# Patient Record
(demographics unavailable — no encounter records)

---

## 2024-11-05 NOTE — REASON FOR VISIT
[Follow-Up] : a follow-up visit [Abnormal CT Scan] : abnormal CT Scan [Bronchiectasis] : bronchiectasis [FreeTextEntry2] : ANDRE

## 2024-11-05 NOTE — ADDENDUM
[FreeTextEntry1] : Pt has been on vest therapy x many yrs At time of initial vest order had productive cough for greater than 6 months  Failed PEP device after months of use, has been using and benefitting with vest therapy x years She continues to have a productive cough and is now in ER with new infiltrates and will be treated as exacerbation of bronchiectasis A new vest order has been placed and is mandatory to her continued care, as well as standard of care in [place]

## 2024-11-05 NOTE — CONSULT LETTER
[Dear  ___] : Dear  [unfilled], [Consult Letter:] : I had the pleasure of evaluating your patient, [unfilled]. [Please see my note below.] : Please see my note below. [Sincerely,] : Sincerely, [FreeTextEntry3] : Wei Rogers DO [facility]\par  Pulmonary Critical Care\par  Director Pulmonary Division\par  Medical Director Respiratory Therapy\par  [facility]\par  \par

## 2024-11-05 NOTE — PHYSICAL EXAM
[General Appearance - Well Developed] : well developed [Normal Appearance] : normal appearance [General Appearance - Well Nourished] : well nourished [Neck Appearance] : the appearance of the neck was normal [Heart Rate And Rhythm] : heart rate and rhythm were normal [Heart Sounds] : normal S1 and S2 [Murmurs] : no murmurs present [Respiration, Rhythm And Depth] : normal respiratory rhythm and effort [Exaggerated Use Of Accessory Muscles For Inspiration] : no accessory muscle use [No Focal Deficits] : no focal deficits [Oriented To Time, Place, And Person] : oriented to person, place, and time [Impaired Insight] : insight and judgment were intact [Affect] : the affect was normal [] : no rash [FreeTextEntry1] : no chest wall abn

## 2024-11-05 NOTE — HISTORY OF PRESENT ILLNESS
[Never] : never [FreeTextEntry1] : ANDRE off therapy x 3 yrs on probiotic Doing well using NEW therapy vest and albuterol neb daily and Flutter valve no fever, chill, chest pain no sig dyspnea, sputum clear Therapy vest bid with nebulizer, albuterol and alternating with saline Uses  Vit C, NAC rotating abx with po Vanco doing well, no acute pulmonary complaints recent sputums noted

## 2024-11-05 NOTE — DISCUSSION/SUMMARY
[FreeTextEntry1] : Bronchiectasis, hx ANDRE, post hospitalization for exacerbation/pna, complicated by C dificile  at  baseline, no acute pulmonary complaints Asthma profile negative, chronic IgA deficiency, CF work up negative Spirometry remains normal Doing well with Afflovest- much improved airway clearance home neb  BID stressed ( albuterol, saline, prn Mucomyst) Continue with  Flutter valve Continue with  rotating ABx ( Amoxil and Doxy), 5 days each month with oral Vanco Repeat CT scan for nodule and infiltrates Repeat sputum c/s and AFB negative to date 6 months or sooner if needed, will call with CT scan

## 2024-11-05 NOTE — PROCEDURE
[FreeTextEntry1] :  11/24 sputum c/s neg, AFB c/s neg to date spirometry remains normal, no sig change 2/23 4/24  CT scan REESE scarring, patchy multifocal bronchiectasis, worse LLL, new REESE nodule

## 2024-12-08 NOTE — ASSESSMENT
[FreeTextEntry1] : 73 YO FEMALE   PMH OF BRONCHIECTASIS ANDRE  ANEMIA  ASTHMA MS HTN WITH  FLARE OF BRONCHIECTASIS VERSUS PNEUMONIA  PT WAS GIVEN OUTPT  ORAL ABX VANTIN  AND  DID NOT RESPOND AND WAS SENT TO THE HOSPITAL FOR FURHTER MANAGEMENT BECUASE OF FEVERS WAS IN HOSP 11/4-/11/8 /23  HAD ANOTHER ADMISSION SAME THIN IN APRO 2024 NOW HERE AGIAN IN DECEMBER 2024   PT HAD ANOTHER EPIDODE OF INCREASED COUGH FEVERS  CAME TO [facility]  AND WAS AMDITTED  MIDLINE PLACED ON IV INVANZ HERE FOR FOLLWUP IWTH  PT FEELS BETTER NO FURHTER FEVER COUGH DECREASED PT ON REGIMEN OF ROTAITNG  ABX FROM PULM   AND HAS PULM VEST  WILL LOOK TO SEE IF SHE CAN [facility] IF THER IS A COHORT FOR  SECOND OPINION  WILL FOLLOWUP  PT WIHT HX OF CI DIFF HAS BEEN USING ORAL VANCO  WHILE ON ABX

## 2024-12-08 NOTE — PHYSICAL EXAM
[General Appearance - Alert] : alert [General Appearance - In No Acute Distress] : in no acute distress [Sclera] : the sclera and conjunctiva were normal [PERRL With Normal Accommodation] : pupils were equal in size, round, reactive to light [Outer Ear] : the ears and nose were normal in appearance [Extraocular Movements] : extraocular movements were intact [Oropharynx] : the oropharynx was normal with no thrush [Neck Appearance] : the appearance of the neck was normal [Neck Cervical Mass (___cm)] : no neck mass was observed [Jugular Venous Distention Increased] : there was no jugular-venous distention [Thyroid Diffuse Enlargement] : the thyroid was not enlarged [Auscultation Breath Sounds / Voice Sounds] : lungs were clear to auscultation bilaterally [Heart Rate And Rhythm] : heart rate was normal and rhythm regular [Heart Sounds Gallop] : no gallops [Heart Sounds] : normal S1 and S2 [Murmurs] : no murmurs [Heart Sounds Pericardial Friction Rub] : no pericardial rub [Full Pulse] : the pedal pulses are present [Edema] : there was no peripheral edema [Abdomen Soft] : soft [Bowel Sounds] : normal bowel sounds [Abdomen Tenderness] : non-tender [Abdomen Mass (___ Cm)] : no abdominal mass palpated [Costovertebral Angle Tenderness] : no CVA tenderness [No Palpable Adenopathy] : no palpable adenopathy [Musculoskeletal - Swelling] : no joint swelling [Nail Clubbing] : no clubbing  or cyanosis of the fingernails [Motor Tone] : muscle strength and tone were normal [Skin Color & Pigmentation] : normal skin color and pigmentation [] : no rash [Deep Tendon Reflexes (DTR)] : deep tendon reflexes were 2+ and symmetric [Sensation] : the sensory exam was normal to light touch and pinprick [No Focal Deficits] : no focal deficits

## 2024-12-08 NOTE — REASON FOR VISIT
[Post Hospitalization] : a post hospitalization visit [Spouse] : spouse [FreeTextEntry1] : BRONCHIECTASIS PNEUMONIA POST HOSP ON IV ABX

## 2024-12-08 NOTE — PHYSICAL EXAM
[General Appearance - Alert] : alert [General Appearance - In No Acute Distress] : in no acute distress [Sclera] : the sclera and conjunctiva were normal [PERRL With Normal Accommodation] : pupils were equal in size, round, reactive to light [Extraocular Movements] : extraocular movements were intact [Outer Ear] : the ears and nose were normal in appearance [Oropharynx] : the oropharynx was normal with no thrush [Neck Appearance] : the appearance of the neck was normal [Neck Cervical Mass (___cm)] : no neck mass was observed [Jugular Venous Distention Increased] : there was no jugular-venous distention [Thyroid Diffuse Enlargement] : the thyroid was not enlarged [Auscultation Breath Sounds / Voice Sounds] : lungs were clear to auscultation bilaterally [Heart Rate And Rhythm] : heart rate was normal and rhythm regular [Heart Sounds Gallop] : no gallops [Heart Sounds] : normal S1 and S2 [Murmurs] : no murmurs [Heart Sounds Pericardial Friction Rub] : no pericardial rub [Full Pulse] : the pedal pulses are present [Edema] : there was no peripheral edema [Abdomen Soft] : soft [Bowel Sounds] : normal bowel sounds [Abdomen Tenderness] : non-tender [Abdomen Mass (___ Cm)] : no abdominal mass palpated [Costovertebral Angle Tenderness] : no CVA tenderness [No Palpable Adenopathy] : no palpable adenopathy [Musculoskeletal - Swelling] : no joint swelling [Nail Clubbing] : no clubbing  or cyanosis of the fingernails [Motor Tone] : muscle strength and tone were normal [Skin Color & Pigmentation] : normal skin color and pigmentation [] : no rash [Deep Tendon Reflexes (DTR)] : deep tendon reflexes were 2+ and symmetric [Sensation] : the sensory exam was normal to light touch and pinprick [No Focal Deficits] : no focal deficits

## 2024-12-08 NOTE — ASSESSMENT
[FreeTextEntry1] : 75 YO FEMALE   PMH OF BRONCHIECTASIS ANDRE  ANEMIA  ASTHMA MS HTN WITH  FLARE OF BRONCHIECTASIS VERSUS PNEUMONIA  PT WAS GIVEN OUTPT  ORAL ABX VANTIN  AND  DID NOT RESPOND AND WAS SENT TO THE HOSPITAL FOR FURHTER MANAGEMENT BECUASE OF FEVERS WAS IN HOSP 11/4-/11/8 /23  HAD ANOTHER ADMISSION SAME THIN IN APRO 2024 NOW HERE AGIAN IN DECEMBER 2024   PT HAD ANOTHER EPIDODE OF INCREASED COUGH FEVERS  CAME TO [facility]  AND WAS AMDITTED  MIDLINE PLACED ON IV INVANZ HERE FOR FOLLWUP IWTH  PT FEELS BETTER NO FURHTER FEVER COUGH DECREASED PT ON REGIMEN OF ROTAITNG  ABX FROM PULM   AND HAS PULM VEST  WILL LOOK TO SEE IF SHE CAN [facility] IF THER IS A COHORT FOR  SECOND OPINION  WILL FOLLOWUP  PT WIHT HX OF CI DIFF HAS BEEN USING ORAL VANCO  WHILE ON ABX

## 2024-12-08 NOTE — HISTORY OF PRESENT ILLNESS
[FreeTextEntry1] : 75 YO FEMALE   PMH OF BRONCHIECTASIS ANDRE  ANEMIA  ASTHMA MS HTN WITH  FLARE OF BRONCHIECTASIS VERSUS PNEUMONIA  PT WAS GIVEN OUTPT  ORAL ABX VANTIN  AND  DID NOT RESPOND AND WAS SENT TO THE HOSPITAL FOR FURHTER MANAGEMENT BECUASE OF FEVERS WAS IN HOSP 11/4-/11/8 /23  HAD ANOTHER ADMISSION SAME THIN IN APRO 2024 NOW HERE AGIAN IN DECEMBER 2024   PT HAD ANOTHER EPIDODE OF INCREASED COUGH FEVERS  CAME TO [facility]  AND WAS AMDITTED  MIDLINE PLACED ON IV INVANZ HERE FOR FOLLWUP IWTH

## 2024-12-08 NOTE — HISTORY OF PRESENT ILLNESS
[FreeTextEntry1] : 75 YO FEMALE   PMH OF BRONCHIECTASIS ANDRE  ANEMIA  ASTHMA MS HTN WITH  FLARE OF BRONCHIECTASIS VERSUS PNEUMONIA  PT WAS GIVEN OUTPT  ORAL ABX VANTIN  AND  DID NOT RESPOND AND WAS SENT TO THE HOSPITAL FOR FURHTER MANAGEMENT BECUASE OF FEVERS WAS IN HOSP 11/4-/11/8 /23  HAD ANOTHER ADMISSION SAME THIN IN APRO 2024 NOW HERE AGIAN IN DECEMBER 2024   PT HAD ANOTHER EPIDODE OF INCREASED COUGH FEVERS  CAME TO [facility]  AND WAS AMDITTED  MIDLINE PLACED ON IV INVANZ HERE FOR FOLLWUP IWTH

## 2025-02-15 NOTE — HISTORY OF PRESENT ILLNESS
[de-identified] : last 2 weeks were  on vantin now developed fever 102.3  when she get pneumoina gets violet chills it is differentthis time  [FreeTextEntry1] : 76 YO FEMALE   PMH OF BRONCHIECTASIS ANDRE  ANEMIA  ASTHMA MS HTN WITH  FLARE OF BRONCHIECTASIS VERSUS PNEUMONIA  PT WAS GIVEN OUTPT  ORAL ABX VANTIN   FORlast 2 weeks   now developed fever 102.3 SHE COMPLETE ABX ABOUT  2 WEEKS AGO  WHEN SHE GET PNEUMONIA SHE GETS VIOLENT FEVER AND CHILLS  HAS HAD MULTIPLE HOSPITAL STAY IN THE PAST HAS USED HOME IV INVANZ VIA MIDLINEIN THE PAST HAS BEEN ON A COURSE OF ROTATING ABX HAD  RENETTA SOLIS AT HOME RECENTLY SAW MD AT [facility] WHO [facility]    HERE FOR MIREILLE PERRY

## 2025-02-15 NOTE — ASSESSMENT
[FreeTextEntry1] : 76 YO FEMALE   PMH OF BRONCHIECTASIS ANDRE  ANEMIA  ASTHMA MS HTN WITH  FLARE OF BRONCHIECTASIS VERSUS PNEUMONIA  PT WAS GIVEN OUTPT  ORAL ABX VANTIN   FORlast 2 weeks   now developed fever 102.3 SHE COMPLETE ABX ABOUT  2 WEEKS AGO  WHEN SHE GET PNEUMONIA SHE GETS VIOLENT FEVER AND CHILLS  HAS HAD MULTIPLE HOSPITAL STAY IN THE PAST HAS USED HOME IV INVANZ VIA MIDLINEIN THE PAST HAS BEEN ON A COURSE OF ROTATING ABX HAD  PILM VEST AT HOME RECENTLY SAW MD AT [facility] WHO [facility]    HERE FOR FOLLWUP IWTH  PT NON TOXIC AFEBRILE HERE WILL CHECK SPUTM FOR CX AND AFB  WILL RX LEVAQUIN 500 FOR 2 WEEKS HAS NOTUSED LEVAQUIN RECENTLY AND IT  PT IS CONCENRD ABOUT USING INVAN OFTEN ALSO HX OF C DIFF  WILL START VANCO WHILE ON LEVAQUIN WILL FOLLOWUP I HAVE DISCUSSED CASE WITH  HER PPULMONARY PHYSCIAN AND HER  WHO IS A PHYSICIAN

## 2025-02-15 NOTE — HISTORY OF PRESENT ILLNESS
[de-identified] : last 2 weeks were  on vantin now developed fever 102.3  when she get pneumoina gets violet chills it is differentthis time  [FreeTextEntry1] : 74 YO FEMALE   PMH OF BRONCHIECTASIS ANDRE  ANEMIA  ASTHMA MS HTN WITH  FLARE OF BRONCHIECTASIS VERSUS PNEUMONIA  PT WAS GIVEN OUTPT  ORAL ABX VANTIN   FORlast 2 weeks   now developed fever 102.3 SHE COMPLETE ABX ABOUT  2 WEEKS AGO  WHEN SHE GET PNEUMONIA SHE GETS VIOLENT FEVER AND CHILLS  HAS HAD MULTIPLE HOSPITAL STAY IN THE PAST HAS USED HOME IV INVANZ VIA MIDLINEIN THE PAST HAS BEEN ON A COURSE OF ROTATING ABX HAD  RENETTA SOLIS AT HOME RECENTLY SAW MD AT [facility] WHO [facility]    HERE FOR MIREILLE PERRY

## 2025-02-15 NOTE — REASON FOR VISIT
[Follow-Up: _____] : a [unfilled] follow-up visit [Spouse] : spouse [FreeTextEntry1] : RECURRENT FEVER BRONCHIECTASIS

## 2025-02-15 NOTE — ASSESSMENT
[FreeTextEntry1] : 74 YO FEMALE   PMH OF BRONCHIECTASIS ANDRE  ANEMIA  ASTHMA MS HTN WITH  FLARE OF BRONCHIECTASIS VERSUS PNEUMONIA  PT WAS GIVEN OUTPT  ORAL ABX VANTIN   FORlast 2 weeks   now developed fever 102.3 SHE COMPLETE ABX ABOUT  2 WEEKS AGO  WHEN SHE GET PNEUMONIA SHE GETS VIOLENT FEVER AND CHILLS  HAS HAD MULTIPLE HOSPITAL STAY IN THE PAST HAS USED HOME IV INVANZ VIA MIDLINEIN THE PAST HAS BEEN ON A COURSE OF ROTATING ABX HAD  PILM VEST AT HOME RECENTLY SAW MD AT [facility] WHO [facility]    HERE FOR FOLLWUP IWTH  PT NON TOXIC AFEBRILE HERE WILL CHECK SPUTM FOR CX AND AFB  WILL RX LEVAQUIN 500 FOR 2 WEEKS HAS NOTUSED LEVAQUIN RECENTLY AND IT  PT IS CONCENRD ABOUT USING INVAN OFTEN ALSO HX OF C DIFF  WILL START VANCO WHILE ON LEVAQUIN WILL FOLLOWUP I HAVE DISCUSSED CASE WITH  HER PPULMONARY PHYSCIAN AND HER  WHO IS A PHYSICIAN

## 2025-02-26 NOTE — REVIEW OF SYSTEMS
[As Noted in HPI] : as noted in HPI [Shortness Of Breath] : shortness of breath [Cough] : cough [Negative] : Heme/Lymph

## 2025-02-26 NOTE — ASSESSMENT
[FreeTextEntry1] : 76 YO FEMALE   PMH OF BRONCHIECTASIS ANDRE  ANEMIA  ASTHMA MS HTN WITH  FLARE OF BRONCHIECTASIS VERSUS PNEUMONIA  PT WAS GIVEN OUTPT  ORAL ABX VANTIN   FORlast 2 weeks   now developed fever 102.3 SHE COMPLETE ABX ABOUT  2 WEEKS AGO  WHEN SHE GET PNEUMONIA SHE GETS VIOLENT FEVER AND CHILLS  HAS HAD MULTIPLE HOSPITAL STAY IN THE PAST HAS USED HOME IV INVANZ VIA MIDLINEIN THE PAST HAS BEEN ON A COURSE OF ROTATING ABX HAD  MASONM VEST AT HOME RECENTLY SAW MD AT [facility] WHO [facility]    HERE FOR FOLLWUP   WAS SEEN TWO WEEKS AGO AND GIVEN LEVAQUIN 500 TO TRY TO AVOID HOSPITAL AND HOME IV AGAIN HERE WITH  FEELS OK  HAS COMPLETED LEVAQUIN AND RPROTS MUCH IMPROVEMNET PT HAS ANOTHER APPT IN [facility] WILL MAINTAION OFF ABX PT GOING TO [facility] BUT HER AND  HAS TRIP PLANNED TO [facility] IN [facility]

## 2025-02-26 NOTE — REASON FOR VISIT
[Follow-Up: _____] : a [unfilled] follow-up visit [Spouse] : spouse [FreeTextEntry1] : BRONCHIECTASIS COUGH FEVER

## 2025-02-26 NOTE — HISTORY OF PRESENT ILLNESS
[FreeTextEntry1] : 76 YO FEMALE   PMH OF BRONCHIECTASIS ANDRE  ANEMIA  ASTHMA MS HTN WITH  FLARE OF BRONCHIECTASIS VERSUS PNEUMONIA  PT WAS GIVEN OUTPT  ORAL ABX VANTIN   FORlast 2 weeks   now developed fever 102.3 SHE COMPLETE ABX ABOUT  2 WEEKS AGO  WHEN SHE GET PNEUMONIA SHE GETS VIOLENT FEVER AND CHILLS  HAS HAD MULTIPLE HOSPITAL STAY IN THE PAST HAS USED HOME IV INVANZ VIA MIDLINEIN THE PAST HAS BEEN ON A COURSE OF ROTATING ABX HAD  RENETTA SOLIS AT HOME RECENTLY SAW MD AT [facility] WHO [facility]    HERE FOR FOLLWUP   WAS SEEN TWO WEEKS AGO AND GIVEN LEVAQUIN 500 TO TRY TO AVOID HOSPITAL AND HOME IV AGAIN HERE WITH  FEELS OK

## 2025-02-26 NOTE — PHYSICAL EXAM
[General Appearance - Alert] : alert [General Appearance - In No Acute Distress] : in no acute distress [Sclera] : the sclera and conjunctiva were normal [PERRL With Normal Accommodation] : pupils were equal in size, round, reactive to light [Extraocular Movements] : extraocular movements were intact [Outer Ear] : the ears and nose were normal in appearance [Oropharynx] : the oropharynx was normal with no thrush [Neck Appearance] : the appearance of the neck was normal [Neck Cervical Mass (___cm)] : no neck mass was observed [Jugular Venous Distention Increased] : there was no jugular-venous distention [Thyroid Diffuse Enlargement] : the thyroid was not enlarged [Auscultation Breath Sounds / Voice Sounds] : lungs were clear to auscultation bilaterally [Heart Rate And Rhythm] : heart rate was normal and rhythm regular [Heart Sounds] : normal S1 and S2 [Heart Sounds Gallop] : no gallops [Murmurs] : no murmurs [Heart Sounds Pericardial Friction Rub] : no pericardial rub [Bowel Sounds] : normal bowel sounds [Abdomen Soft] : soft [Abdomen Tenderness] : non-tender [Abdomen Mass (___ Cm)] : no abdominal mass palpated [Costovertebral Angle Tenderness] : no CVA tenderness [No Palpable Adenopathy] : no palpable adenopathy [Musculoskeletal - Swelling] : no joint swelling [Nail Clubbing] : no clubbing  or cyanosis of the fingernails [Motor Tone] : muscle strength and tone were normal [Skin Color & Pigmentation] : normal skin color and pigmentation [] : no rash

## 2025-05-10 NOTE — CONSULT LETTER
[Dear  ___] : Dear  [unfilled], [Consult Letter:] : I had the pleasure of evaluating your patient, [unfilled]. [Please see my note below.] : Please see my note below. [Sincerely,] : Sincerely, [FreeTextEntry3] : Wei [facility] DO [facility]\par  Pulmonary Critical Care\par  Director Pulmonary Division\par  Medical Director Respiratory Therapy\par  [facility]\par  \par

## 2025-05-10 NOTE — CONSULT LETTER
[Dear  ___] : Dear  [unfilled], [Consult Letter:] : I had the pleasure of evaluating your patient, [unfilled]. [Please see my note below.] : Please see my note below. [Sincerely,] : Sincerely, [FreeTextEntry3] : Wei [facility] DO [facility]\par  Pulmonary Critical Care\par  Director Pulmonary Division\par  Medical Director Respiratory Therapy\par  [facility]\par  \par

## 2025-05-10 NOTE — HISTORY OF PRESENT ILLNESS
[Never] : never [FreeTextEntry1] : ANDRE off therapy x 3 yrs on probiotic Doing well using NEW therapy vest and albuterol neb daily and Flutter valve no fever, chill, chest pain no sig dyspnea, sputum clear Therapy vest bid with nebulizer,  with saline Uses  Vit C, NAC Much improved post Levaquin

## 2025-05-10 NOTE — DISCUSSION/SUMMARY
[FreeTextEntry1] : Bronchiectasis, hx ANDRE, post hospitalization for exacerbation/pna, complicated by C dificile  Now much improved, post 14 days of Levaquin 500 at  baseline, no acute pulmonary complaints, no sig cough Asthma profile negative, chronic IgA deficiency, CF work up negative Last PFts were  normal continue  airway clearance vest with nebulized saline bid Continue with  Flutter valve Repeat CT scan per Dr Pretty discussed with pt Continue to repeat sputum c/s and AFB Await Brensocatib DPP inhibitor approval 3-6 months or sooner if needed,

## 2025-06-26 NOTE — ASSESSMENT
[FreeTextEntry1] : 75F p/w L minimall displaced patella fx  WBAT in mame locked in ext f/u 2 weeks for repeat XR  The patient was advised of the diagnosis. The natural history of the pathology was explained in full to the patient in layman's terms. All questions were answered. The risks and benefits of surgical and non-surgical treatment alternatives were explained in full to the patient.

## 2025-06-26 NOTE — HISTORY OF PRESENT ILLNESS
[Retired] : Work status: retired [de-identified] : 75F p/w L knee pain, she fell backwards yesteday and has had worsening pain since, had XR showing patella fx, managing pain with tylenol and advil [] : no [FreeTextEntry3] : 6/25/25 [FreeTextEntry5] : Patient states had a fall [de-identified] : shaun [de-identified] : [facility]

## 2025-07-08 NOTE — HISTORY OF PRESENT ILLNESS
[5] : 5 [0] : 0 [Retired] : Work status: retired [de-identified] : 75F p/w L knee pain, she fell backwards yesteday and has had worsening pain since, had XR showing patella fx, managing pain with tylenol and advil  7/8/25 f/u L knee patella fx, pain improving, ambulating well with brace [de-identified] : no

## 2025-07-08 NOTE — ASSESSMENT
[FreeTextEntry1] : 75F p/w L minimall displaced patella fx  3v knee XR without any displacement of fx WBAT in mame locked in ext f/u 2 weeks for repeat XR  The patient was advised of the diagnosis. The natural history of the pathology was explained in full to the patient in layman's terms. All questions were answered. The risks and benefits of surgical and non-surgical treatment alternatives were explained in full to the patient.

## 2025-07-24 NOTE — IMAGING
[Left] : left knee [AP] : anteroposterior [Lateral] : lateral [[place]] : skyline [The fracture is in acceptable alignment. There is progression in healing seen] : The fracture is in acceptable alignment. There is progression in healing seen [FreeTextEntry9] : healing noted

## 2025-07-24 NOTE — ASSESSMENT
[FreeTextEntry1] : 75F now 1mo s/p fall L minimally displaced patella fx  3v knee XR with significant healing compared to prior xray, no change in fx alignment May D/C mame Start PT for motion f/u 4 weeks for repeat XR  The patient was advised of the diagnosis. The natural history of the pathology was explained in full to the patient in layman's terms. All questions were answered. The risks and benefits of surgical and non-surgical treatment alternatives were explained in full to the patient.

## 2025-07-24 NOTE — HISTORY OF PRESENT ILLNESS
[5] : 5 [0] : 0 [Retired] : Work status: retired [de-identified] : 75F p/w L knee pain, she fell backwards yesterday and has had worsening pain since, had XR showing patella fx, managing pain with tylenol and advil  7/8/25 f/u L knee patella fx, pain improving, ambulating well with brace  7/24/25 f/u L knee, 4 weeks s/p fall, L knee patella fx.  She has been in brace at all times, using walker.  No complaints. [de-identified] : no

## 2025-07-24 NOTE — HISTORY OF PRESENT ILLNESS
[5] : 5 [0] : 0 [Retired] : Work status: retired [de-identified] : 75F p/w L knee pain, she fell backwards yesterday and has had worsening pain since, had XR showing patella fx, managing pain with tylenol and advil  7/8/25 f/u L knee patella fx, pain improving, ambulating well with brace  7/24/25 f/u L knee, 4 weeks s/p fall, L knee patella fx.  She has been in brace at all times, using walker.  No complaints. [de-identified] : no

## 2025-07-24 NOTE — PHYSICAL EXAM
[Left] : left knee [] : lateral facet of patella tenderness [FreeTextEntry3] : [place] in place [FreeTextEntry8] : +SLR

## 2025-07-24 NOTE — IMAGING
[Left] : left knee [AP] : anteroposterior [Lateral] : lateral [[place]] : skyline [The fracture is in acceptable alignment. There is progression in healing seen] : The fracture is in acceptable alignment. There is progression in healing seen [FreeTextEntry9] : healing noted

## 2025-07-24 NOTE — PHYSICAL EXAM
[Left] : left knee [] : lateral facet of patella tenderness [FreeTextEntry3] : [place] in place [FreeTextEntry8] : +SLR